# Patient Record
(demographics unavailable — no encounter records)

---

## 2024-10-22 NOTE — PHYSICAL EXAM
[General Appearance - Alert] : alert [General Appearance - In No Acute Distress] : in no acute distress [General Appearance - Well Nourished] : well nourished [General Appearance - Well Developed] : well developed [Sclera] : the sclera and conjunctiva were normal [PERRL With Normal Accommodation] : pupils were equal in size, round, and reactive to light [Extraocular Movements] : extraocular movements were intact [Outer Ear] : the ears and nose were normal in appearance [Neck Appearance] : the appearance of the neck was normal [Neck Cervical Mass (___cm)] : no neck mass was observed [Jugular Venous Distention Increased] : there was no jugular-venous distention [Thyroid Diffuse Enlargement] : the thyroid was not enlarged [Respiration, Rhythm And Depth] : normal respiratory rhythm and effort [Exaggerated Use Of Accessory Muscles For Inspiration] : no accessory muscle use [Auscultation Breath Sounds / Voice Sounds] : lungs were clear to auscultation bilaterally [Chest Palpation] : palpation of the chest revealed no abnormalities [Apical Impulse] : the apical impulse was normal [Heart Rate And Rhythm] : heart rate was normal and rhythm regular [Heart Sounds] : normal S1 and S2 [Heart Sounds Gallop] : no gallops [Murmurs] : no murmurs [Edema] : there was no peripheral edema [FreeTextEntry1] : i found no edema of the ankles, no local heat or redness. there is a localized area of swelling on the lateral malleolus area, soft to touch, no pitting.  [Bowel Sounds] : normal bowel sounds [Abdomen Soft] : soft [Abdomen Tenderness] : non-tender [] : no hepato-splenomegaly [Abdomen Mass (___ Cm)] : no abdominal mass palpated [Cervical Lymph Nodes Enlarged Posterior Bilaterally] : posterior cervical [Cervical Lymph Nodes Enlarged Anterior Bilaterally] : anterior cervical [Supraclavicular Lymph Nodes Enlarged Bilaterally] : supraclavicular [Axillary Lymph Nodes Enlarged Bilaterally] : axillary

## 2024-10-22 NOTE — PLAN
[FreeTextEntry1] : i have recommended my patient to continue to stay out of work for an additional 4 weeks. will explore possibilities to get a transfer to a different school at her next visit, but for now i think patient needs additional time to recover. will start her on a nasal steroid see if this helps. will see her in one month to evaluate. will repeat some tests at next visit, especially heavy metals in the urine. continue care with pmd. letter for work re: continue to stop working as per medical recommendation. reassurance and support.  [FreeTextEntry2] : unable to determine [FreeTextEntry3] : unknown. [FreeTextEntry4] : 1 mo

## 2024-10-22 NOTE — HISTORY OF PRESENT ILLNESS
[Has the patient missed work because of the injury/illness?] : The patient has missed work because of the injury/illness. [FreeTextEntry1] : patient is here for follow up. patient has been out of work as per my recommendation. patient reports that her condition has been overall improving while out of work. she reports improvement in her headache, general sense of malaise, generalized swelling. at present continues with nasal and chest congestion, noticing postnasal drip that has been going to her chest, stating "nothing comes out" but she feels chest and nose congestion. she continues to notice blurry vision in her left eye.   facial numbness and periorbital left pain have improved. continues with ankle swelling, is under ortho care for this.  patient tried systemic antihistamine with not much improvement.  patient has been out of work as per my recommendation.   wc: case established.  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 overall patient reports a loss of her general wellbeing and physical abilities, with increased and easy fatigability, shortness of breath, chest tightness and vision problems and headaches.  [FreeTextEntry4] : under several doctors, has tried several meds, some with some temporal relief.  [FreeTextEntry5] : none [No] : The patient is currently not working. [FreeTextEntry6] : apirl 2023, most recent september 2024

## 2024-10-22 NOTE — ASSESSMENT
[Indicate if, in your opinion, the incident that the patient described was the competent medical cause of this injury/illness.] : The incident that the patient described was the competent medical cause of this injury/illness: Yes [Indicate if the patient's complaints are consistent with his/her history of the injury/illness.] : Indicate if the patient's complaints are consistent with his/her history of the injury/illness: Yes [Yes] : Yes, it is consistent [Can the patient return to usual work activities as indicated? If yes, indicate date___] : The patient cannot return to usual work activities as indicated. [Are there any work limitations? (If so, explain and quantify, including the anticipated duration of the limitations)] : There are work limitations. 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 still some persistent symptoms especially airway congestion and vision issues. patient is overall recovering.  [FreeTextEntry5] : 100 [FreeTextEntry6] : clinical exam, review of medical records [FreeTextEntry7] : patient should permanently avoid exposure to triggers

## 2024-11-22 NOTE — HISTORY OF PRESENT ILLNESS
[Has the patient missed work because of the injury/illness?] : The patient has missed work because of the injury/illness. [No] : The patient is currently not working. [FreeTextEntry1] : patient is here for follow up. patient continues especially with pain in her lower extremities, ankles and feet. she was seen by a doctor who reportedly told she had a lot of inflammation and recommended PT and meds. patient reacted to meds, and she is not currently taking those. has been doing PT. in doing this, she sprained her right ankle. she is currently on rest, avoidance of walking and PT. was told by her PT that she had some neurological abnormalities on exam, especially dialing with memory and balance. was recommended a neuropsych test.  continues with a sensation of pressure in her left eye, although her vision is better. her respiratory congestion is better. her overall swelling has subsided.  continues out of work as per my recommendation.   wc: case established.  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 overall patient reports a loss of her general wellbeing and physical abilities, with increased and easy fatigability, shortness of breath, chest tightness and vision problems and headaches.  [FreeTextEntry4] : under several doctors, has tried several meds, some with some temporal relief.  [FreeTextEntry5] : none [FreeTextEntry6] : apirl 2023, most recent september 2024

## 2024-11-22 NOTE — PLAN
[FreeTextEntry1] : i have recommended my patient to continue to stay out of work for an additional 4 weeks. will explore possibilities to get a transfer to a different school at her next visit, but for now i think patient needs additional time to recover. will stop nasal steroid. continue ortho care. referred for neuropsych test. will see her in one month to evaluate. will repeat heavy metals in the urine. continue care with pmd. letter for work re: continue to stop working as per medical recommendation. reassurance and support.  [FreeTextEntry2] : unable to determine [FreeTextEntry3] : unknown. [FreeTextEntry4] : 1 mo

## 2024-11-22 NOTE — ASSESSMENT
[Indicate if, in your opinion, the incident that the patient described was the competent medical cause of this injury/illness.] : The incident that the patient described was the competent medical cause of this injury/illness: Yes [Indicate if the patient's complaints are consistent with his/her history of the injury/illness.] : Indicate if the patient's complaints are consistent with his/her history of the injury/illness: Yes [Yes] : Yes, it is consistent [Can the patient return to usual work activities as indicated? If yes, indicate date___] : The patient cannot return to usual work activities as indicated. [Are there any work limitations? (If so, explain and quantify, including the anticipated duration of the limitations)] : There are work limitations. 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 still some persistent symptoms. experiencing leg inflammation and injured her right ankle. patient is overall recovering.  [FreeTextEntry5] : 100 [FreeTextEntry6] : clinical exam, review of medical records [FreeTextEntry7] : patient should permanently avoid exposure to triggers

## 2024-12-27 NOTE — HISTORY OF PRESENT ILLNESS
[Has the patient missed work because of the injury/illness?] : The patient has missed work because of the injury/illness. [No] : The patient is currently not working. [FreeTextEntry1] : patient's symptoms have been slowly improving. she continues with pain in her lower extremities, ankles and feet, getting better with PT. she has been noticing weakness and stiffness in her left wrist, especially when doing chores with her hands, even writing. notices that the generalized swelling has decreased significantly.  her headaches are better. nasal congestion and throat congestion are better overall, but she notices that she has become much more sensitive to strong odors, perfumes, fragrances, chemicals and other irritants.  continues treatment with her orthopedist and a counselor. is pending neuro eval and neuropsych test. was referred to rheumatology.  continues out of work as per my recommendation.   wc: case established.  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 overall patient reports a loss of her general wellbeing and physical abilities, with increased and easy fatigability, shortness of breath, chest tightness and vision problems and headaches.  [FreeTextEntry4] : under several doctors, has tried several meds, some with some temporal relief.  [FreeTextEntry5] : none [FreeTextEntry6] : apirl 2023, most recent september 2024

## 2024-12-27 NOTE — PLAN
[FreeTextEntry1] : i have recommended my patient to continue to stay out of work for an additional 4 weeks. i have authorized a trial of rtw with accommodations, mainly transferring to a new building, to start end of january 2025. letter for work requesting accommodations. continue care with pmd. reassurance and support. will see her some one week after rtw or before if necessary. i have instructed my patient to immediately stop working and return home should her symptmos relapse upon returning to work. [FreeTextEntry2] : trial of rtw january 2025 [FreeTextEntry3] : unknown. [FreeTextEntry4] : 1 mo

## 2024-12-27 NOTE — PHYSICAL EXAM
[General Appearance - Alert] : alert [General Appearance - In No Acute Distress] : in no acute distress [General Appearance - Well Nourished] : well nourished [General Appearance - Well Developed] : well developed [Sclera] : the sclera and conjunctiva were normal [PERRL With Normal Accommodation] : pupils were equal in size, round, and reactive to light [Extraocular Movements] : extraocular movements were intact [Outer Ear] : the ears and nose were normal in appearance [Neck Appearance] : the appearance of the neck was normal [Neck Cervical Mass (___cm)] : no neck mass was observed [Jugular Venous Distention Increased] : there was no jugular-venous distention [Thyroid Diffuse Enlargement] : the thyroid was not enlarged [] : no respiratory distress [Respiration, Rhythm And Depth] : normal respiratory rhythm and effort [Exaggerated Use Of Accessory Muscles For Inspiration] : no accessory muscle use [Auscultation Breath Sounds / Voice Sounds] : lungs were clear to auscultation bilaterally [Chest Palpation] : palpation of the chest revealed no abnormalities [Apical Impulse] : the apical impulse was normal [Heart Rate And Rhythm] : heart rate was normal and rhythm regular [Heart Sounds] : normal S1 and S2 [Heart Sounds Gallop] : no gallops [Murmurs] : no murmurs [Edema] : there was no peripheral edema [Cervical Lymph Nodes Enlarged Posterior Bilaterally] : posterior cervical [Cervical Lymph Nodes Enlarged Anterior Bilaterally] : anterior cervical [Supraclavicular Lymph Nodes Enlarged Bilaterally] : supraclavicular [Axillary Lymph Nodes Enlarged Bilaterally] : axillary [FreeTextEntry1] : tnere may be some sinovitis to palpatoin at left middle and riing fingers mcp joints, but no obvious swelling. no swelling of the wrist. tinel was positive left median nerve to 3-4 digits; negative at guyon's and negative r wrist. there were no sensory differences to superficial touch in her hands,  strength had thumb to index and thumb to pinky were normal b.

## 2024-12-27 NOTE — ASSESSMENT
[Indicate if, in your opinion, the incident that the patient described was the competent medical cause of this injury/illness.] : The incident that the patient described was the competent medical cause of this injury/illness: Yes [Indicate if the patient's complaints are consistent with his/her history of the injury/illness.] : Indicate if the patient's complaints are consistent with his/her history of the injury/illness: Yes [Yes] : Yes, it is consistent [Are there any work limitations? (If so, explain and quantify, including the anticipated duration of the limitations)] : There are work limitations. [Can the patient return to usual work activities as indicated? If yes, indicate date___] : The patient cannot return to usual work activities as indicated. 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 her heavy metal test in the urine is now normal after patient has been out of the school environment. pt still has some persistent symptoms.  [FreeTextEntry5] : 100 [FreeTextEntry7] : patient should permanently avoid exposure to triggers [FreeTextEntry6] : clinical exam, review of medical records

## 2025-01-31 NOTE — PLAN
[FreeTextEntry1] : will do a trial of montelukast and olopatadine, see if these help. patient would like to continue to stay at work if she is able to. however, i have instructed my patient to immediately stop working and return home should her symptoms relapse. letter for work requesting accommodations issued again today. continue care with pmd. reassurance and support. will see her some two weeks. or before if necessary. will do a histamine level in urine today and will repeat urine for heavy metals upon her return next visit.  [FreeTextEntry2] : trial of rtw january 2025 [FreeTextEntry3] : unknown. [FreeTextEntry4] : 1 mo

## 2025-01-31 NOTE — ASSESSMENT
[Indicate if, in your opinion, the incident that the patient described was the competent medical cause of this injury/illness.] : The incident that the patient described was the competent medical cause of this injury/illness: Yes [Indicate if the patient's complaints are consistent with his/her history of the injury/illness.] : Indicate if the patient's complaints are consistent with his/her history of the injury/illness: Yes [Yes] : Yes, it is consistent [Can the patient return to usual work activities as indicated? If yes, indicate date___] : The patient can return to usual work activities on [unfilled] [Are there any work limitations? (If so, explain and quantify, including the anticipated duration of the limitations)] : There are work limitations. [FreeTextEntry1] : patient is experiencing a relapse of her respiratory condition upon re-exposure at the school. request for accommodation was not honored by her employer.  [FreeTextEntry5] : 75 [FreeTextEntry6] : clinical exam, review of medical records [FreeTextEntry7] : patient should permanently avoid exposure to triggers, letter for accommodations issued again

## 2025-01-31 NOTE — HISTORY OF PRESENT ILLNESS
[Has the patient missed work because of the injury/illness?] : The patient has missed work because of the injury/illness. [FreeTextEntry1] : patient has been back to work for some 3 days. accommodation request was not honored by her employer. she is noticing a relapse in her symptoms with heaviness in her eyes, eye irritation, intolerance to light, headaches, nasal and throat congestion and some cough with no phlegm, a sensation of pain/burning in her mid anterior chest and swelling of her ankles and right lower extremity. 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 she feels there are some rooms that are worse than others in relation to her symptoms, but it is difficult to distinguish. prior to returning to school patient was back to her baseline status, with no symptoms at all. upon returning home from work her symptoms take some time to subside and eventually improve. she is noticing respiratory irritation to exposure to strong odors, extreme heat and humidity, even out of school.  continues treatment with her orthopedist and a counselor. is pending neuro eval.  is back to work on a trial to rtw as stated.  wc: case established.  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 overall patient reports a loss of her general wellbeing and physical abilities, with increased and easy fatigability, shortness of breath, chest tightness and vision problems and headaches.  [FreeTextEntry4] : under several doctors, has tried several meds, some with some temporal relief.  [FreeTextEntry5] : none [Yes] : The patient is currently working. [Resumed limited work of any kind: ______] : The patient resumed limited work on [unfilled]. [FreeTextEntry6] : apirl 2023, most recent september 2024

## 2025-01-31 NOTE — PHYSICAL EXAM
[General Appearance - Alert] : alert [General Appearance - In No Acute Distress] : in no acute distress [General Appearance - Well Nourished] : well nourished [General Appearance - Well Developed] : well developed [Sclera] : the sclera and conjunctiva were normal [PERRL With Normal Accommodation] : pupils were equal in size, round, and reactive to light [Extraocular Movements] : extraocular movements were intact [Outer Ear] : the ears and nose were normal in appearance [Neck Appearance] : the appearance of the neck was normal [Neck Cervical Mass (___cm)] : no neck mass was observed [Jugular Venous Distention Increased] : there was no jugular-venous distention [Thyroid Diffuse Enlargement] : the thyroid was not enlarged [Respiration, Rhythm And Depth] : normal respiratory rhythm and effort [Exaggerated Use Of Accessory Muscles For Inspiration] : no accessory muscle use [Auscultation Breath Sounds / Voice Sounds] : lungs were clear to auscultation bilaterally [Chest Palpation] : palpation of the chest revealed no abnormalities [Apical Impulse] : the apical impulse was normal [Heart Rate And Rhythm] : heart rate was normal and rhythm regular [Heart Sounds] : normal S1 and S2 [Heart Sounds Gallop] : no gallops [Murmurs] : no murmurs [Edema] : there was no peripheral edema [FreeTextEntry1] : however patient states she feels her ankles slightly more swollen than usual.  [Bowel Sounds] : normal bowel sounds [Abdomen Soft] : soft [Abdomen Tenderness] : non-tender [] : no hepato-splenomegaly [Abdomen Mass (___ Cm)] : no abdominal mass palpated [Cervical Lymph Nodes Enlarged Posterior Bilaterally] : posterior cervical [Cervical Lymph Nodes Enlarged Anterior Bilaterally] : anterior cervical [Supraclavicular Lymph Nodes Enlarged Bilaterally] : supraclavicular [Axillary Lymph Nodes Enlarged Bilaterally] : axillary

## 2025-02-07 NOTE — HISTORY OF PRESENT ILLNESS
[FreeTextEntry1] : patient is experiencing a severe relapse of her symptoms upon exposures at school. she states that the heating and ventilation systems have been turned on. she notices that when exposed to the air coming from the vent she develops blurry vision, heaviness in her eyes, eye irritation, intolerance to light, headaches, nasal and throat congestion and some cough with no phlegm, a sensation of pain/burning in her mid anterior chest and a sensation of brain fog with difficulty concentrating and focusing. she is noticing worsening swelling of her ankles and right lower extremity and has been experiencing lower back pain. she has not had any fever or chills, but felt very cold a few  nights ago. symptoms present anywhere she is at the school.  continues treatment with her orthopedist and a counselor. is pending neuro eval.  is back to work on a trial to rtw as stated.  wc: case established.  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 overall patient reports a loss of her general wellbeing and physical abilities, with increased and easy fatigability, shortness of breath, chest tightness and vision problems and headaches.  [FreeTextEntry4] : under several doctors, has tried several meds, some with some temporal relief.  [FreeTextEntry5] : none [Has the patient missed work because of the injury/illness?] : The patient has missed work because of the injury/illness. [No] : The patient is currently not working. [FreeTextEntry6] : april 2023, most recent today

## 2025-02-07 NOTE — ASSESSMENT
[Indicate if, in your opinion, the incident that the patient described was the competent medical cause of this injury/illness.] : The incident that the patient described was the competent medical cause of this injury/illness: Yes [Indicate if the patient's complaints are consistent with his/her history of the injury/illness.] : Indicate if the patient's complaints are consistent with his/her history of the injury/illness: Yes [Yes] : Yes, it is consistent [Can the patient return to usual work activities as indicated? If yes, indicate date___] : The patient can return to usual work activities on [unfilled] [Are there any work limitations? (If so, explain and quantify, including the anticipated duration of the limitations)] : There are work limitations. [FreeTextEntry1] : patient is experiencing an acute relapse of her respiratory conditions upon re-exposure at the school. request for accommodation was not honored by her employer.  [FreeTextEntry5] : 100 [FreeTextEntry6] : clinical exam, review of medical records [FreeTextEntry7] : patient should permanently avoid exposure to triggers, letter for accommodations issued again

## 2025-02-07 NOTE — PLAN
[FreeTextEntry1] : continue montelukast and olopatadine. add famotidine for gerd symptoms. i have recommended my patient to stay out of work for one week due to the acute relapse of her condition. letter for work produced. i have instructed my patient to immediately stop working and return home should her symptoms relapse. letter for work requesting accommodations issued again today. continue care with pmd. reassurance and support. will see her in a week. labs and cxr today.  [FreeTextEntry2] : temporarily totally disabled [FreeTextEntry3] : unknown. [FreeTextEntry4] : 1 week

## 2025-02-07 NOTE — PHYSICAL EXAM
[General Appearance - Alert] : alert [General Appearance - In No Acute Distress] : in no acute distress [General Appearance - Well Nourished] : well nourished [General Appearance - Well Developed] : well developed [Sclera] : the sclera and conjunctiva were normal [PERRL With Normal Accommodation] : pupils were equal in size, round, and reactive to light [Extraocular Movements] : extraocular movements were intact [Outer Ear] : the ears and nose were normal in appearance [Neck Appearance] : the appearance of the neck was normal [Neck Cervical Mass (___cm)] : no neck mass was observed [Jugular Venous Distention Increased] : there was no jugular-venous distention [Thyroid Diffuse Enlargement] : the thyroid was not enlarged [Respiration, Rhythm And Depth] : normal respiratory rhythm and effort [Exaggerated Use Of Accessory Muscles For Inspiration] : no accessory muscle use [Auscultation Breath Sounds / Voice Sounds] : lungs were clear to auscultation bilaterally [Chest Palpation] : palpation of the chest revealed no abnormalities [FreeTextEntry1] : no tender to palpation over costochondral joints.  [Apical Impulse] : the apical impulse was normal [Heart Rate And Rhythm] : heart rate was normal and rhythm regular [Heart Sounds] : normal S1 and S2 [Heart Sounds Gallop] : no gallops [Murmurs] : no murmurs [Bowel Sounds] : normal bowel sounds [Abdomen Soft] : soft [Abdomen Tenderness] : non-tender [] : no hepato-splenomegaly [Abdomen Mass (___ Cm)] : no abdominal mass palpated [Cervical Lymph Nodes Enlarged Posterior Bilaterally] : posterior cervical [Cervical Lymph Nodes Enlarged Anterior Bilaterally] : anterior cervical [Supraclavicular Lymph Nodes Enlarged Bilaterally] : supraclavicular [Axillary Lymph Nodes Enlarged Bilaterally] : axillary

## 2025-02-14 NOTE — ASSESSMENT
Baby Box provided to patient with instructions for safe use including:    -Box should never be used to transport infant.  -Box should always be used on a flat, sturdy surface, preferably the floor.  -General Safe Sleep rules and recommendations reviewed.    Baby Box warning labels reviewed with patient. [employer] videos viewed by Parents. Patient/family verbalizes understanding, all questions answered at this time.    [Indicate if, in your opinion, the incident that the patient described was the competent medical cause of this injury/illness.] : The incident that the patient described was the competent medical cause of this injury/illness: Yes [Indicate if the patient's complaints are consistent with his/her history of the injury/illness.] : Indicate if the patient's complaints are consistent with his/her history of the injury/illness: Yes [Yes] : Yes, it is consistent [Can the patient return to usual work activities as indicated? If yes, indicate date___] : The patient can return to usual work activities on [unfilled] [Are there any work limitations? (If so, explain and quantify, including the anticipated duration of the limitations)] : There are work limitations. [FreeTextEntry1] : lab test results discussed with patient. a. 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 the fact that her condition has improved while out of work, in my medical opinion, further confirms a relationship between exposure and medical consequences. the fact that her symptoms improved after a significant discharge from her upper respiratory tract, in my opinion, is due to relief of an inflammatory response that resulted in mucous hyperproduction, probably sinus inflammation that further explains her symptoms, and was relieved with avoidance of exposure.  i am recommending my patient to remain out of work until march 3rd. will do a trial of return to work at that time with the proviso that my patient should leave the school in case her symtpoms are again made worse upon exposures at the school. pt understood. will see her on the friday after first week back to work or before if necessary.  patient needs medical accommodaitons consisting of transferring her to a different location as requested.  [FreeTextEntry5] : 100 [FreeTextEntry6] : clinical exam, review of medical records [FreeTextEntry7] : patient should permanently avoid exposure to triggers, letter for accommodations issued again
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
## 2025-02-14 NOTE — PLAN
[FreeTextEntry1] : continue montelukast, olopatadine and famotidine. stay out of work until march 3. 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 pt understood. will see her on the friday after first week back to work or before if necessary.  patient needs medical accommodations consisting of transferring her to a different location as requested. letter for work requesting accommodations issued again today. continue care with pmd. reassurance and support.  [FreeTextEntry2] : temporarily totally disabled. trial of rtw starting mar 3 [FreeTextEntry3] : unknown. [FreeTextEntry4] : 3 weeks

## 2025-02-14 NOTE — PHYSICAL EXAM
[General Appearance - Alert] : alert [General Appearance - In No Acute Distress] : in no acute distress [FreeTextEntry1] : over the telephone patient sounded in no difficulty breathing, she was alert and oriented x 3, her responses were adequate. no coughing was noted during the visit.

## 2025-03-14 NOTE — PLAN
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 she could return to work after a few days rest and going back to baseline. will see her in one month or before if necessary. 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  patient needs medical accommodations consisting of transferring her to a different location as requested. letter for work requesting accommodations issued again today. continue care with pmd. reassurance and support.  [FreeTextEntry2] : currently working. [FreeTextEntry3] : unknown. [FreeTextEntry4] : 4 weeks or before if necessary

## 2025-03-14 NOTE — ASSESSMENT
[Indicate if, in your opinion, the incident that the patient described was the competent medical cause of this injury/illness.] : The incident that the patient described was the competent medical cause of this injury/illness: Yes [Indicate if the patient's complaints are consistent with his/her history of the injury/illness.] : Indicate if the patient's complaints are consistent with his/her history of the injury/illness: Yes [Yes] : Yes, it is consistent [Can the patient return to usual work activities as indicated? If yes, indicate date___] : The patient can return to usual work activities on [unfilled] [Are there any work limitations? (If so, explain and quantify, including the anticipated duration of the limitations)] : There are work limitations. [FreeTextEntry1] : patient was better while out of work and has experienced relapse of her symptoms upon re-exposure at the school, similar as in previous occasions. once again, the fact that her condition improves while out of work and worsens upon re-exposure, in my medical opinion, further confirms a relationship between exposure and medical consequences.  patient wants to continue to work. i am recommending my patient to leave the school in case her symptoms are made worse upon exposures at the school. she may return to work after rest at home.   patient needs medical accommodations consisting of transferring her to a different location as requested.  [FreeTextEntry5] : 75 [FreeTextEntry6] : clinical exam, review of medical records [FreeTextEntry7] : patient should permanently avoid exposure to triggers, letter for accommodations.

## 2025-03-14 NOTE — PHYSICAL EXAM
[General Appearance - Alert] : alert [General Appearance - In No Acute Distress] : in no acute distress [General Appearance - Well Nourished] : well nourished [General Appearance - Well Developed] : well developed [Sclera] : the sclera and conjunctiva were normal [PERRL With Normal Accommodation] : pupils were equal in size, round, and reactive to light [Extraocular Movements] : extraocular movements were intact [Outer Ear] : the ears and nose were normal in appearance [FreeTextEntry1] : tender at both maxillary and frontal sinuses, worse left where patient describes a sensation of numbness. oropharynx overall clear. anterior rhinoscopy: abundant crusting bilateral, swelling right turbinate.   [Neck Appearance] : the appearance of the neck was normal [Neck Cervical Mass (___cm)] : no neck mass was observed [Jugular Venous Distention Increased] : there was no jugular-venous distention [Thyroid Diffuse Enlargement] : the thyroid was not enlarged [Respiration, Rhythm And Depth] : normal respiratory rhythm and effort [Exaggerated Use Of Accessory Muscles For Inspiration] : no accessory muscle use [Auscultation Breath Sounds / Voice Sounds] : lungs were clear to auscultation bilaterally [Chest Palpation] : palpation of the chest revealed no abnormalities [Apical Impulse] : the apical impulse was normal [Heart Rate And Rhythm] : heart rate was normal and rhythm regular [Heart Sounds] : normal S1 and S2 [Heart Sounds Gallop] : no gallops [Murmurs] : no murmurs [Edema] : there was no peripheral edema [Bowel Sounds] : normal bowel sounds [Abdomen Soft] : soft [Abdomen Tenderness] : non-tender [] : no hepato-splenomegaly [Abdomen Mass (___ Cm)] : no abdominal mass palpated [Cervical Lymph Nodes Enlarged Anterior Bilaterally] : anterior cervical [Cervical Lymph Nodes Enlarged Posterior Bilaterally] : posterior cervical [Supraclavicular Lymph Nodes Enlarged Bilaterally] : supraclavicular [Axillary Lymph Nodes Enlarged Bilaterally] : axillary
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
## 2025-05-20 NOTE — PLAN
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 she could return to work after a few days rest and going back to baseline. will continue air supra as needed. will do a trial of meloxicam on a regular basis, daily for two weeks, see how she responds to this. will reevaluate in two weeks. letter for work re: transfer completed. 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 will repeat heavy metals in urine and beta-w microglobulin. pt has been referred to hematologist. reassurance and support. patient needs medical accommodations consisting of transferring her to a different location as requested. letter for work requesting accommodations issued again today. continue care with pmd. reassurance and support.  [FreeTextEntry2] : currently working. [FreeTextEntry3] : unknown. [FreeTextEntry4] : 2 weeks

## 2025-05-20 NOTE — ASSESSMENT
[Indicate if, in your opinion, the incident that the patient described was the competent medical cause of this injury/illness.] : The incident that the patient described was the competent medical cause of this injury/illness: Yes [Indicate if the patient's complaints are consistent with his/her history of the injury/illness.] : Indicate if the patient's complaints are consistent with his/her history of the injury/illness: Yes [Yes] : Yes, it is consistent [Can the patient return to usual work activities as indicated? If yes, indicate date___] : The patient can return to usual work activities on [unfilled] [Are there any work limitations? (If so, explain and quantify, including the anticipated duration of the limitations)] : There are work limitations. [FreeTextEntry1] : by clinical history patient seems to be reacting to exposures at the school with nonspecific generalized symptoms, involving especially her respiratory, neurologic and rheumatologic organs/systems. her reaction does not seem to be an allergic reaction as tests have been overall negative and a trial of singulair did not help. it could be a toxic effect of exposure, most probably to a combination of agents that is difficult to clarify.  patient wants to continue to work. i am continuing to recommend my patient to leave the school in case her symptoms are made worse upon exposures at the school. she may return to work after rest at home.  patient needs medical accommodations consisting of transferring her to a different location as requested but not honored. will redo a letter requesting transfer to a different school and specifically avoiding the current school.  [FreeTextEntry5] : 75 [FreeTextEntry6] : clinical exam, review of medical records [FreeTextEntry7] : patient should permanently avoid exposure to triggers, letter for accommodations.

## 2025-05-20 NOTE — PHYSICAL EXAM
[General Appearance - Alert] : alert [General Appearance - In No Acute Distress] : in no acute distress [General Appearance - Well Nourished] : well nourished [General Appearance - Well Developed] : well developed [Sclera] : the sclera and conjunctiva were normal [PERRL With Normal Accommodation] : pupils were equal in size, round, and reactive to light [Extraocular Movements] : extraocular movements were intact [Outer Ear] : the ears and nose were normal in appearance [Neck Appearance] : the appearance of the neck was normal [Neck Cervical Mass (___cm)] : no neck mass was observed [Jugular Venous Distention Increased] : there was no jugular-venous distention [Thyroid Diffuse Enlargement] : the thyroid was not enlarged [] : no respiratory distress [Respiration, Rhythm And Depth] : normal respiratory rhythm and effort [Exaggerated Use Of Accessory Muscles For Inspiration] : no accessory muscle use [Auscultation Breath Sounds / Voice Sounds] : lungs were clear to auscultation bilaterally [Chest Palpation] : palpation of the chest revealed no abnormalities [Apical Impulse] : the apical impulse was normal [Heart Rate And Rhythm] : heart rate was normal and rhythm regular [Heart Sounds] : normal S1 and S2 [Heart Sounds Gallop] : no gallops [Murmurs] : no murmurs [Edema] : there was no peripheral edema [FreeTextEntry1] : no pitting edema in her lower extremites.  [Cervical Lymph Nodes Enlarged Posterior Bilaterally] : posterior cervical [Cervical Lymph Nodes Enlarged Anterior Bilaterally] : anterior cervical [Supraclavicular Lymph Nodes Enlarged Bilaterally] : supraclavicular [Axillary Lymph Nodes Enlarged Bilaterally] : axillary

## 2025-06-06 NOTE — PLAN
[FreeTextEntry1] : will add a course of zpack and nasal steroid in case there is an intercurrent infection aggravating her condition. 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 she could return to work after a few days rest and going back to baseline. 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 reassurance and support. patient needs medical accommodations consisting of transferring her to a different location as requested. letter for work requesting accommodations issued again today. continue care with pmd.  will see her in one month or before if needed. .  [FreeTextEntry2] : currently working. [FreeTextEntry3] : unknown. [FreeTextEntry4] : 1 month

## 2025-06-06 NOTE — HISTORY OF PRESENT ILLNESS
[Home] : at home, [unfilled] , at the time of the visit. [Medical Office: ([facility])___] : at the medical office located in  [Telehealth (audio & video)] : This visit was provided via telehealth using real-time 2-way audio visual technology. [Verbal consent obtained from patient] : the patient, [unfilled] [FreeTextEntry1] : patient experienced an acute episode of very severe headache that started rather suddenly some 4-5 days ago. she describes that she was noticing sinus congestion and thickened nasal secretions at the end of the week last week. then on sunday she suddenly developed a very severe pain behind both ears that radiated to the back and then to the front of her head, with blurry vision. pain was very severe, she went to urgent care, was referred to neurology. she was given meds for migraine with which the severity of the pain has decreased to some degree. however, pain persists as is her sinus congestion and nasal secretions. she reports no fever.  she has been out of work as per the neurologist's recommendation and is to get further tests for this episode. prior to this, she was at the school, noticing continuation of her symptoms of headache, blurry vision, sinus and nose congestion, and generalized swelling.  patient continues to take days/hours off work as needed to go back home and recover, as per my medical instructions. the school has approved her transfer effective september this year.  wc: case established.  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 overall patient reports a loss of her general wellbeing and physical abilities, with increased and easy fatigability, shortness of breath, chest tightness and vision problems and headaches.  [FreeTextEntry4] : under several doctors, has tried several meds, some with some temporal relief.  [FreeTextEntry5] : none [Has the patient missed work because of the injury/illness?] : The patient has missed work because of the injury/illness. [No] : The patient is currently not working. [FreeTextEntry6] : april 2023, most recent may 2025 2025

## 2025-06-06 NOTE — ASSESSMENT
[Indicate if, in your opinion, the incident that the patient described was the competent medical cause of this injury/illness.] : The incident that the patient described was the competent medical cause of this injury/illness: Yes [Indicate if the patient's complaints are consistent with his/her history of the injury/illness.] : Indicate if the patient's complaints are consistent with his/her history of the injury/illness: Yes [Yes] : Yes, it is consistent [Can the patient return to usual work activities as indicated? If yes, indicate date___] : The patient can return to usual work activities on [unfilled] [Are there any work limitations? (If so, explain and quantify, including the anticipated duration of the limitations)] : There are work limitations. 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 she most probably is experiencing an exacerbation of her condition, she may have an intercurrent infection aggravating her baseline condition.  patient wants to continue to work. i am continuing to recommend my patient to leave the school in case her symptoms are made worse upon exposures at the school. she may return to work after rest at home.  patient needs medical accommodations consisting of transferring her to a different location as requested but not honored. will redo a letter requesting transfer to a different school and specifically avoiding the current school. accommodations have been approved effectively september 2025. at present she is temporarily totally disabled as per neurology recommendation.  [FreeTextEntry5] : 100 [FreeTextEntry6] : clinical exam, review of medical records [FreeTextEntry7] : patient should permanently avoid exposure to triggers, letter for accommodations.

## 2025-06-06 NOTE — PHYSICAL EXAM
[General Appearance - Alert] : alert [General Appearance - In No Acute Distress] : in no acute distress [FreeTextEntry1] : over teleheatlh, aram looked in no difficulty breathing. she was alert and oriented x 3, her responses were adequate. she was able to speak in full sentences but her voice did sound somehow congested. however, no cough or wheezing noted during the visit.

## 2025-07-01 NOTE — PHYSICAL EXAM
[General Appearance - Alert] : alert [General Appearance - In No Acute Distress] : in no acute distress [General Appearance - Well Nourished] : well nourished [General Appearance - Well Developed] : well developed [Sclera] : the sclera and conjunctiva were normal [PERRL With Normal Accommodation] : pupils were equal in size, round, and reactive to light [Extraocular Movements] : extraocular movements were intact [Outer Ear] : the ears and nose were normal in appearance [Neck Appearance] : the appearance of the neck was normal [Neck Cervical Mass (___cm)] : no neck mass was observed [Jugular Venous Distention Increased] : there was no jugular-venous distention [Thyroid Diffuse Enlargement] : the thyroid was not enlarged [] : no respiratory distress [Respiration, Rhythm And Depth] : normal respiratory rhythm and effort [Exaggerated Use Of Accessory Muscles For Inspiration] : no accessory muscle use [Auscultation Breath Sounds / Voice Sounds] : lungs were clear to auscultation bilaterally [Chest Palpation] : palpation of the chest revealed no abnormalities no [Apical Impulse] : the apical impulse was normal [Heart Rate And Rhythm] : heart rate was normal and rhythm regular [Heart Sounds] : normal S1 and S2 [Heart Sounds Gallop] : no gallops [Murmurs] : no murmurs [Edema] : there was no peripheral edema [FreeTextEntry1] : i found no edema of the ankles, no local heat or redness. there is a localized area of swelling on the lateral malleolus area, soft to touch, no pitting.  [Cervical Lymph Nodes Enlarged Posterior Bilaterally] : posterior cervical [Cervical Lymph Nodes Enlarged Anterior Bilaterally] : anterior cervical [Supraclavicular Lymph Nodes Enlarged Bilaterally] : supraclavicular [Axillary Lymph Nodes Enlarged Bilaterally] : axillary